# Patient Record
Sex: FEMALE | Race: WHITE
[De-identification: names, ages, dates, MRNs, and addresses within clinical notes are randomized per-mention and may not be internally consistent; named-entity substitution may affect disease eponyms.]

---

## 2019-07-17 ENCOUNTER — HOSPITAL ENCOUNTER (OUTPATIENT)
Dept: HOSPITAL 33 - SDC-PAIN | Age: 84
Discharge: HOME | End: 2019-07-17
Attending: PSYCHIATRY & NEUROLOGY
Payer: MEDICARE

## 2019-07-17 DIAGNOSIS — M10.9: ICD-10-CM

## 2019-07-17 DIAGNOSIS — M54.16: Primary | ICD-10-CM

## 2019-07-17 DIAGNOSIS — N19: ICD-10-CM

## 2019-07-17 PROCEDURE — 62321 NJX INTERLAMINAR CRV/THRC: CPT

## 2019-07-17 PROCEDURE — 77003 FLUOROGUIDE FOR SPINE INJECT: CPT

## 2019-07-17 PROCEDURE — 72040 X-RAY EXAM NECK SPINE 2-3 VW: CPT

## 2019-07-17 NOTE — XRAY
Indication: C7-T1 PANCHITO.



Intraoperative fluoroscopy was provided for 24 seconds.  2 digital spot images

submitted for interpretation demonstrates midline needle tip just posterior to

the C7-T1 interspace.  Small amount of contrast injected for needle tip

placement.  Correlate with intraoperative findings/report.